# Patient Record
(demographics unavailable — no encounter records)

---

## 2024-11-14 NOTE — HISTORY OF PRESENT ILLNESS
[de-identified] : cough wheeze [FreeTextEntry6] : presents with 2 day history of a cough wheeze heard today  given albuterol neb this AM prior to coming here no respiratory distress rhinorrhea presently past history of asthma has been on ICS in 2022 but not recently

## 2024-11-14 NOTE — DISCUSSION/SUMMARY
[FreeTextEntry1] : since he is clear no reason to do anything but albuterol nebs TID for next several days appears to be URI triggered return should this escalate

## 2024-11-14 NOTE — PHYSICAL EXAM
[Clear Rhinorrhea] : clear rhinorrhea [Clear to Auscultation Bilaterally] : clear to auscultation bilaterally [NL] : warm, clear [Erythematous Oropharynx] : nonerythematous oropharynx [Wheezing] : no wheezing [Rales] : no rales

## 2024-11-14 NOTE — REVIEW OF SYSTEMS
[Nasal Discharge] : nasal discharge [Wheezing] : wheezing [Cough] : cough [Negative] : Genitourinary [Fever] : no fever [Headache] : no headache [Ear Pain] : no ear pain [Sore Throat] : no sore throat [Tachypnea] : not tachypneic Admission

## 2024-12-19 NOTE — HISTORY OF PRESENT ILLNESS
[Parents] : parents [Normal] : Normal [Brushing teeth twice/d] : brushing teeth twice per day [No] : Patient does not go to dentist yearly [Toothpaste] : Primary Fluoride Source: Toothpaste [Playtime (60 min/d)] : playtime 60 min a day [Grade ___] : Grade [unfilled] [Appropriately restrained in motor vehicle] : appropriately restrained in motor vehicle [Supervised outdoor play] : supervised outdoor play [Supervised around water] : supervised around water [Wears helmet and pads] : wears helmet and pads [Parent knows child's friends] : parent knows child's friends [Parent discusses safety rules regarding adults] : parent discusses safety rules regarding adults [Monitored computer use] : monitored computer use [Family discusses home emergency plan] : family discusses home emergency plan [Up to date] : Up to date [NO] : No [Exposure to electronic nicotine delivery system] : No exposure to electronic nicotine delivery system [FreeTextEntry7] : 8 YO WELL EXAM [de-identified] : DOING WELL NO ISSUES [de-identified] : GOO DIET

## 2024-12-19 NOTE — DISCUSSION/SUMMARY
[Normal Growth] : growth [Normal Development] : development [None] : No known medical problems [No Elimination Concerns] : elimination [No Feeding Concerns] : feeding [No Skin Concerns] : skin [Normal Sleep Pattern] : sleep [School] : school [Development and Mental Health] : development and mental health [Nutrition and Physical Activity] : nutrition and physical activity [Oral Health] : oral health [Safety] : safety [No Medications] : ~He/She~ is not on any medications [Patient] : patient [Full Activity without restrictions including Physical Education & Athletics] : Full Activity without restrictions including Physical Education & Athletics [] : The components of the vaccine(s) to be administered today are listed in the plan of care. The disease(s) for which the vaccine(s) are intended to prevent and the risks have been discussed with the caretaker.  The risks are also included in the appropriate vaccination information statements which have been provided to the patient's caregiver.  The caregiver has given consent to vaccinate. [FreeTextEntry1] : FLU VAC DECLINED TODAY Provided counseling on the diseases to be vaccinated against as well as the risks/benefits of providing and withholding recommended vaccines to be given today to MARICARMEN .All questions were answered and the parent verbalized understanding.  HEARING WNL  SEES OPTHO  UA IN OFICE   CBC SENT TO LAB   TB risk assessment completed- no risk for TB. PPD not required  Discussed safety/feeding/sleep as appropriate for age.  Time allowed for questions and all answered with understanding.

## 2025-03-11 NOTE — PHYSICAL EXAM
[Clear] : right tympanic membrane clear [Clear to Auscultation Bilaterally] : clear to auscultation bilaterally [NL] : warm, clear [Acute Distress] : no acute distress [Erythematous Oropharynx] : nonerythematous oropharynx

## 2025-03-11 NOTE — REVIEW OF SYSTEMS
[Nasal Congestion] : nasal congestion [Cough] : cough [Negative] : Genitourinary [Headache] : no headache [Ear Pain] : no ear pain [Nasal Discharge] : no nasal discharge

## 2025-03-11 NOTE — HISTORY OF PRESENT ILLNESS
[de-identified] : 4 days cough [FreeTextEntry6] : presents with 4 days of cough no fevers no respiratory distress hx of asthma

## 2025-04-15 NOTE — REVIEW OF SYSTEMS
[Nasal Congestion] : nasal congestion [Cough] : cough [Negative] : Genitourinary [Fever] : no fever [Headache] : no headache [Nasal Discharge] : no nasal discharge [Congestion] : no congestion [Vomiting] : no vomiting

## 2025-04-15 NOTE — PHYSICAL EXAM
[Alert] : alert [Clear] : right tympanic membrane clear [Clear to Auscultation Bilaterally] : clear to auscultation bilaterally [NL] : warm, clear [Acute Distress] : no acute distress [Erythematous Oropharynx] : nonerythematous oropharynx [FreeTextEntry4] : mild congestion

## 2025-04-15 NOTE — HISTORY OF PRESENT ILLNESS
[de-identified] : cough congestion [FreeTextEntry6] : has it for several days no respiratory distress no need for asthma meds or nebulizer